# Patient Record
Sex: MALE | Race: ASIAN | NOT HISPANIC OR LATINO | URBAN - METROPOLITAN AREA
[De-identification: names, ages, dates, MRNs, and addresses within clinical notes are randomized per-mention and may not be internally consistent; named-entity substitution may affect disease eponyms.]

---

## 2017-03-24 ENCOUNTER — EMERGENCY (EMERGENCY)
Facility: HOSPITAL | Age: 32
LOS: 1 days | Discharge: HOME | End: 2017-03-24
Attending: EMERGENCY MEDICINE | Admitting: EMERGENCY MEDICINE
Payer: COMMERCIAL

## 2017-03-24 VITALS
OXYGEN SATURATION: 100 % | HEART RATE: 74 BPM | RESPIRATION RATE: 12 BRPM | TEMPERATURE: 98 F | SYSTOLIC BLOOD PRESSURE: 113 MMHG | DIASTOLIC BLOOD PRESSURE: 71 MMHG

## 2017-03-24 VITALS
DIASTOLIC BLOOD PRESSURE: 79 MMHG | OXYGEN SATURATION: 100 % | HEART RATE: 73 BPM | RESPIRATION RATE: 16 BRPM | TEMPERATURE: 97 F | SYSTOLIC BLOOD PRESSURE: 129 MMHG

## 2017-03-24 PROCEDURE — 93010 ELECTROCARDIOGRAM REPORT: CPT

## 2017-03-24 PROCEDURE — 71020: CPT | Mod: 26

## 2017-03-24 PROCEDURE — 99284 EMERGENCY DEPT VISIT MOD MDM: CPT | Mod: 25

## 2017-03-24 RX ORDER — FAMOTIDINE 10 MG/ML
40 INJECTION INTRAVENOUS ONCE
Qty: 0 | Refills: 0 | Status: COMPLETED | OUTPATIENT
Start: 2017-03-24 | End: 2017-03-24

## 2017-03-24 RX ORDER — ACETAMINOPHEN 500 MG
650 TABLET ORAL ONCE
Qty: 0 | Refills: 0 | Status: COMPLETED | OUTPATIENT
Start: 2017-03-24 | End: 2017-03-24

## 2017-03-24 RX ADMIN — Medication 650 MILLIGRAM(S): at 07:16

## 2017-03-24 RX ADMIN — Medication 30 MILLILITER(S): at 06:46

## 2017-03-24 RX ADMIN — Medication 650 MILLIGRAM(S): at 06:46

## 2017-03-24 RX ADMIN — FAMOTIDINE 40 MILLIGRAM(S): 10 INJECTION INTRAVENOUS at 06:46

## 2017-03-24 NOTE — ED PROVIDER NOTE - CARE PLAN
Principal Discharge DX:	Chest pain Principal Discharge DX:	Chest pain  Instructions for follow-up, activity and diet:	Call a cardiologist today or tomorrow to schedule follow up appointment for within the next 3-5 days.  Continue taking your medications as prescribed.  Take pepcid 20mg twice daily. This is available over the counter.  Return to this Emergency Department if you experience worsening condition or for any other emergency.

## 2017-03-24 NOTE — ED ADULT TRIAGE NOTE - CHIEF COMPLAINT QUOTE
comes in for chest tightness that feels like heartburn for past week.  pain is constant. denies any medical history. denies sob, nausea, dizziness, or weakness.

## 2017-03-24 NOTE — ED PROVIDER NOTE - PROGRESS NOTE DETAILS
Dieudonne Jo MD PGY3: Imaging reviewed. Symptoms improved with symptom targeted therapy. Will discharge with instructions for outpatient follow-up. [place]: Pt feeling much better. CXR wnl, comfortable for dc.

## 2017-03-24 NOTE — ED ADULT NURSE NOTE - OBJECTIVE STATEMENT
Patient received in room #3 A&Ox3, c/o intermittent chest heaviness and burning x1.5weeks. Patient states "it feels kind of like heart burn". Patient reports eating makes chest heaviness and burning worse. Patient c/o sob due to the heaviness of the chest. Patient denies N/V, lightheadedness or dizziness. NSR on CM. MD at bedside for evaluation. Will monitor.

## 2017-03-24 NOTE — ED PROVIDER NOTE - OBJECTIVE STATEMENT
31M no PMH p/w cp. Has midsternal chest heaviness/burning, x~10d, constant but waxing/waning, non-radiating, worse after food, better after deep breaths, not similar to prior, non-exertional, no acute changes but finally decided to come to ED. No associated SOB, diaphoresis, focal weakness/numbness, lightheaded/palpitations, NVD, abd pain, urinary complaints, LE pain/swelling, recent travel/immobilization. No FMH CAD/clots.

## 2017-03-24 NOTE — ED PROVIDER NOTE - ATTENDING CONTRIBUTION TO CARE
31 no PMH p/w 10d of waxing/waning chest heaviness/burning, worse after food, no other systemic complaints. Vitals, exam, ekg wnl.  ddx: Likely GERD/gastritis/PUD vs. MSK. PERC neg. Clinically not PE, ACS, dissction, tamponade, myocarditis, perf PTX.  CXR, symptom control, likely dc, outpt pmd f/u.

## 2017-03-24 NOTE — ED PROVIDER NOTE - PLAN OF CARE
Call a cardiologist today or tomorrow to schedule follow up appointment for within the next 3-5 days.  Continue taking your medications as prescribed.  Take pepcid 20mg twice daily. This is available over the counter.  Return to this Emergency Department if you experience worsening condition or for any other emergency.

## 2017-03-27 ENCOUNTER — TRANSCRIPTION ENCOUNTER (OUTPATIENT)
Age: 32
End: 2017-03-27

## 2017-03-27 PROBLEM — Z00.00 ENCOUNTER FOR PREVENTIVE HEALTH EXAMINATION: Status: ACTIVE | Noted: 2017-03-27

## 2017-03-31 ENCOUNTER — APPOINTMENT (OUTPATIENT)
Dept: PULMONOLOGY | Facility: CLINIC | Age: 32
End: 2017-03-31

## 2017-03-31 VITALS
HEIGHT: 66 IN | WEIGHT: 140 LBS | HEART RATE: 84 BPM | OXYGEN SATURATION: 99 % | DIASTOLIC BLOOD PRESSURE: 76 MMHG | BODY MASS INDEX: 22.5 KG/M2 | SYSTOLIC BLOOD PRESSURE: 106 MMHG

## 2017-03-31 DIAGNOSIS — Z87.891 PERSONAL HISTORY OF NICOTINE DEPENDENCE: ICD-10-CM

## 2017-03-31 DIAGNOSIS — R06.02 SHORTNESS OF BREATH: ICD-10-CM

## 2017-03-31 DIAGNOSIS — R07.1 CHEST PAIN ON BREATHING: ICD-10-CM

## 2017-03-31 DIAGNOSIS — R05 COUGH: ICD-10-CM

## 2017-03-31 DIAGNOSIS — Z78.9 OTHER SPECIFIED HEALTH STATUS: ICD-10-CM

## 2017-03-31 RX ORDER — ALBUTEROL SULFATE 90 UG/1
108 (90 BASE) AEROSOL, METERED RESPIRATORY (INHALATION)
Qty: 1 | Refills: 3 | Status: ACTIVE | COMMUNITY
Start: 2017-03-31 | End: 1900-01-01

## 2017-04-03 PROBLEM — Z87.891 FORMER SMOKER: Status: ACTIVE | Noted: 2017-03-31

## 2017-04-03 PROBLEM — R07.1 CHEST PAIN ON BREATHING: Status: ACTIVE | Noted: 2017-03-31

## 2017-04-03 PROBLEM — Z78.9 KNOWN HEALTH PROBLEMS: NONE: Status: RESOLVED | Noted: 2017-04-03 | Resolved: 2017-04-03

## 2017-04-03 PROBLEM — R06.02 SHORTNESS OF BREATH: Status: ACTIVE | Noted: 2017-03-31

## 2017-06-23 ENCOUNTER — APPOINTMENT (OUTPATIENT)
Dept: PULMONOLOGY | Facility: CLINIC | Age: 32
End: 2017-06-23